# Patient Record
Sex: FEMALE | Race: WHITE
[De-identification: names, ages, dates, MRNs, and addresses within clinical notes are randomized per-mention and may not be internally consistent; named-entity substitution may affect disease eponyms.]

---

## 2019-11-01 ENCOUNTER — HOSPITAL ENCOUNTER (OUTPATIENT)
Dept: HOSPITAL 95 - LAB | Age: 59
Discharge: HOME | End: 2019-11-01
Attending: NURSE PRACTITIONER
Payer: COMMERCIAL

## 2019-11-01 DIAGNOSIS — Z12.11: Primary | ICD-10-CM

## 2019-11-01 PROCEDURE — G0328 FECAL BLOOD SCRN IMMUNOASSAY: HCPCS

## 2020-06-28 ENCOUNTER — HOSPITAL ENCOUNTER (OUTPATIENT)
Dept: HOSPITAL 95 - ER | Age: 60
Setting detail: OBSERVATION
LOS: 1 days | Discharge: HOME | End: 2020-06-29
Attending: HOSPITALIST | Admitting: HOSPITALIST
Payer: COMMERCIAL

## 2020-06-28 DIAGNOSIS — I10: ICD-10-CM

## 2020-06-28 DIAGNOSIS — G43.909: ICD-10-CM

## 2020-06-28 DIAGNOSIS — E78.5: ICD-10-CM

## 2020-06-28 DIAGNOSIS — G45.4: Primary | ICD-10-CM

## 2020-06-28 DIAGNOSIS — Z79.899: ICD-10-CM

## 2020-06-28 LAB
ALBUMIN SERPL BCP-MCNC: 4.1 G/DL (ref 3.4–5)
ALBUMIN/GLOB SERPL: 1.3 {RATIO} (ref 0.8–1.8)
ALT SERPL W P-5'-P-CCNC: 83 U/L (ref 12–78)
ANION GAP SERPL CALCULATED.4IONS-SCNC: 7 MMOL/L (ref 6–16)
AST SERPL W P-5'-P-CCNC: 36 U/L (ref 12–37)
BASOPHILS # BLD AUTO: 0.05 K/MM3 (ref 0–0.23)
BASOPHILS NFR BLD AUTO: 1 % (ref 0–2)
BILIRUB SERPL-MCNC: 0.7 MG/DL (ref 0.1–1)
BUN SERPL-MCNC: 22 MG/DL (ref 8–24)
CALCIUM SERPL-MCNC: 8.8 MG/DL (ref 8.5–10.1)
CHLORIDE SERPL-SCNC: 109 MMOL/L (ref 98–108)
CO2 SERPL-SCNC: 25 MMOL/L (ref 21–32)
CREAT SERPL-MCNC: 0.86 MG/DL (ref 0.4–1)
DEPRECATED RDW RBC AUTO: 41 FL (ref 35.1–46.3)
EOSINOPHIL # BLD AUTO: 0.13 K/MM3 (ref 0–0.68)
EOSINOPHIL NFR BLD AUTO: 2 % (ref 0–6)
ERYTHROCYTE [DISTWIDTH] IN BLOOD BY AUTOMATED COUNT: 11.9 % (ref 11.7–14.2)
ETHANOL SERPL-MCNC: <3 MG/DL
GLOBULIN SER CALC-MCNC: 3.2 G/DL (ref 2.2–4)
GLUCOSE SERPL-MCNC: 114 MG/DL (ref 70–99)
HCT VFR BLD AUTO: 42 % (ref 33–51)
HGB BLD-MCNC: 14 G/DL (ref 11.5–16)
IMM GRANULOCYTES # BLD AUTO: 0.02 K/MM3 (ref 0–0.1)
IMM GRANULOCYTES NFR BLD AUTO: 0 % (ref 0–1)
LYMPHOCYTES # BLD AUTO: 3.78 K/MM3 (ref 0.84–5.2)
LYMPHOCYTES NFR BLD AUTO: 52 % (ref 21–46)
MCHC RBC AUTO-ENTMCNC: 33.3 G/DL (ref 31.5–36.5)
MCV RBC AUTO: 94 FL (ref 80–100)
MONOCYTES # BLD AUTO: 0.63 K/MM3 (ref 0.16–1.47)
MONOCYTES NFR BLD AUTO: 9 % (ref 4–13)
NEUTROPHILS # BLD AUTO: 2.7 K/MM3 (ref 1.96–9.15)
NEUTROPHILS NFR BLD AUTO: 37 % (ref 41–73)
NRBC # BLD AUTO: 0 K/MM3 (ref 0–0.02)
NRBC BLD AUTO-RTO: 0 /100 WBC (ref 0–0.2)
PLATELET # BLD AUTO: 237 K/MM3 (ref 150–400)
POTASSIUM SERPL-SCNC: 3.8 MMOL/L (ref 3.5–5.5)
PROT SERPL-MCNC: 7.3 G/DL (ref 6.4–8.2)
SODIUM SERPL-SCNC: 141 MMOL/L (ref 136–145)

## 2020-06-28 PROCEDURE — G0480 DRUG TEST DEF 1-7 CLASSES: HCPCS

## 2020-06-28 PROCEDURE — G0378 HOSPITAL OBSERVATION PER HR: HCPCS

## 2020-06-28 NOTE — NUR
PT ARRIVED ON UNIT AT 2100 VIA WC. INTRODUCED TO STAFF AND ROOM. PT ABLE TO
GET MOST OF THE A/O QUESTIONS RIGHT EXCEPT FOR DAY OF WEEK. PT SAID SATURDAY
INSTEAD OF . PT ABLE TO ANSWER PRESIDENT, YEAR, NAME, , WHERE SHE IS
CORRECTLY. HOWEVER PT COULD NOT RECALL WHY SHE IS IN THE HOSPITAL. LOVENOX
ORDERED AND PT WAS EDUCATED ON WHY IT IS GIVEN. PT ASKED ME WHAT THE LOVENOX
WAS FOR 3 TIMES WHILE I WAS GETTING IT PREPARED. PT COULD NOT RECALL WHAT SHE
WAS DOING BEFORE COMING TO THE HOSPITAL. PT ALSO KEPT ON ASKING WHERE HER
 WAS. REMINDED PT ABOUT VISITING HOURS. PT CALLED . SHE WAS ABLE
TO REMEMBER HIS PHONE NUMBER. AMBULATES GOOD. BED ALARM ON BECAUSE OF CONT NS
RUNNING. PUPILS EQUAL AND REACTIVE TO LIGHT. NO SLURRED SPEECH NOTED.
TONGUE MIDLINE. NO WEAKNESS NOTED. CALL LIGHT WITHIN REACH.

## 2020-06-29 NOTE — NUR
PATIENT DC'D TO HOME WITH . NO NEW RX MEDICATIONS. DC INSTRUCTIONS AND
EDUCATION DISCUSSED WITH PATIENT AND COPY PROVIDED. PATIENT DENIED ANY FURTHER
QUESTIONS OR CONCERNS. F/U APPT WAS MADE FOR JULY 2ND AT 1045.

## 2020-06-29 NOTE — NUR
NIGHT SHIFT SUMMARY
  PT HAS BEEN COOPERATIVE AND PLEASANT. MOSTLY SHORT TERM MEMORY LOSS FROM
WHAT I HAVE NOTICIED. WHEN I ASK PT TYPICAL QUESTIONS PT WOULD SOMETIMES
REPLY WITH AN ANSWER WITH SOME GIGGLES AND SMIRK/GRIN. VSS. DENIES PAIN,
DIZZINIES, SOB. SLEPT WELL TONIGHT.

## 2020-10-06 ENCOUNTER — HOSPITAL ENCOUNTER (OUTPATIENT)
Dept: HOSPITAL 95 - PLD | Age: 60
Discharge: HOME | End: 2020-10-06
Attending: DERMATOLOGY
Payer: COMMERCIAL

## 2020-10-06 DIAGNOSIS — D22.5: Primary | ICD-10-CM

## 2020-11-13 ENCOUNTER — HOSPITAL ENCOUNTER (OUTPATIENT)
Dept: HOSPITAL 95 - ORSCSDS | Age: 60
Discharge: HOME | End: 2020-11-13
Attending: INTERNAL MEDICINE
Payer: COMMERCIAL

## 2020-11-13 VITALS — WEIGHT: 145.51 LBS | HEIGHT: 62.01 IN | BODY MASS INDEX: 26.44 KG/M2

## 2020-11-13 DIAGNOSIS — Z79.82: ICD-10-CM

## 2020-11-13 DIAGNOSIS — K62.1: ICD-10-CM

## 2020-11-13 DIAGNOSIS — I10: ICD-10-CM

## 2020-11-13 DIAGNOSIS — K21.9: ICD-10-CM

## 2020-11-13 DIAGNOSIS — Z79.899: ICD-10-CM

## 2020-11-13 DIAGNOSIS — E78.5: ICD-10-CM

## 2020-11-13 DIAGNOSIS — Z12.11: Primary | ICD-10-CM

## 2020-11-13 DIAGNOSIS — D12.2: ICD-10-CM

## 2020-11-13 DIAGNOSIS — K57.30: ICD-10-CM

## 2020-11-13 DIAGNOSIS — D12.3: ICD-10-CM

## 2020-11-13 PROCEDURE — 0DBP8ZX EXCISION OF RECTUM, VIA NATURAL OR ARTIFICIAL OPENING ENDOSCOPIC, DIAGNOSTIC: ICD-10-PCS | Performed by: INTERNAL MEDICINE

## 2020-11-13 PROCEDURE — 0DBL8ZX EXCISION OF TRANSVERSE COLON, VIA NATURAL OR ARTIFICIAL OPENING ENDOSCOPIC, DIAGNOSTIC: ICD-10-PCS | Performed by: INTERNAL MEDICINE

## 2020-11-13 PROCEDURE — 0DBK8ZX EXCISION OF ASCENDING COLON, VIA NATURAL OR ARTIFICIAL OPENING ENDOSCOPIC, DIAGNOSTIC: ICD-10-PCS | Performed by: INTERNAL MEDICINE

## 2021-09-21 ENCOUNTER — HOSPITAL ENCOUNTER (OUTPATIENT)
Dept: HOSPITAL 95 - LAB | Age: 61
Discharge: HOME | End: 2021-09-21
Attending: DERMATOLOGY
Payer: COMMERCIAL

## 2021-09-21 DIAGNOSIS — D22.5: Primary | ICD-10-CM

## 2021-09-21 DIAGNOSIS — D22.61: ICD-10-CM
